# Patient Record
Sex: FEMALE | Race: WHITE | HISPANIC OR LATINO | Employment: STUDENT | URBAN - METROPOLITAN AREA
[De-identification: names, ages, dates, MRNs, and addresses within clinical notes are randomized per-mention and may not be internally consistent; named-entity substitution may affect disease eponyms.]

---
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
## 2025-01-15 ENCOUNTER — HOSPITAL ENCOUNTER (EMERGENCY)
Facility: HOSPITAL | Age: 20
Discharge: HOME | End: 2025-01-16
Attending: STUDENT IN AN ORGANIZED HEALTH CARE EDUCATION/TRAINING PROGRAM
Payer: MEDICAID

## 2025-01-15 DIAGNOSIS — R07.89 CHEST DISCOMFORT: ICD-10-CM

## 2025-01-15 DIAGNOSIS — E28.2 PCOS (POLYCYSTIC OVARIAN SYNDROME): ICD-10-CM

## 2025-01-15 DIAGNOSIS — R07.9 CHEST PAIN, UNSPECIFIED TYPE: Primary | ICD-10-CM

## 2025-01-15 DIAGNOSIS — R07.9 CHEST PAIN: ICD-10-CM

## 2025-01-15 LAB
ALBUMIN SERPL BCP-MCNC: 4.5 G/DL (ref 3.5–5.2)
ALP SERPL-CCNC: 79 U/L (ref 40–150)
ALT SERPL W/O P-5'-P-CCNC: 42 U/L (ref 10–44)
ANION GAP SERPL CALC-SCNC: 12 MMOL/L (ref 8–16)
AST SERPL-CCNC: 35 U/L (ref 10–40)
B-HCG UR QL: NEGATIVE
BASOPHILS # BLD AUTO: 0.04 K/UL (ref 0–0.2)
BASOPHILS NFR BLD: 0.6 % (ref 0–1.9)
BILIRUB SERPL-MCNC: 0.4 MG/DL (ref 0.1–1)
BUN SERPL-MCNC: 7 MG/DL (ref 6–20)
CALCIUM SERPL-MCNC: 10.4 MG/DL (ref 8.7–10.5)
CHLORIDE SERPL-SCNC: 108 MMOL/L (ref 95–110)
CO2 SERPL-SCNC: 20 MMOL/L (ref 23–29)
CREAT SERPL-MCNC: 0.8 MG/DL (ref 0.5–1.4)
CTP QC/QA: YES
D DIMER PPP IA.FEU-MCNC: 0.69 MG/L FEU
DIFFERENTIAL METHOD BLD: NORMAL
EOSINOPHIL # BLD AUTO: 0.3 K/UL (ref 0–0.5)
EOSINOPHIL NFR BLD: 4.3 % (ref 0–8)
ERYTHROCYTE [DISTWIDTH] IN BLOOD BY AUTOMATED COUNT: 12.7 % (ref 11.5–14.5)
EST. GFR  (NO RACE VARIABLE): >60 ML/MIN/1.73 M^2
GLUCOSE SERPL-MCNC: 96 MG/DL (ref 70–110)
HCT VFR BLD AUTO: 39.4 % (ref 37–48.5)
HGB BLD-MCNC: 13.5 G/DL (ref 12–16)
IMM GRANULOCYTES # BLD AUTO: 0.02 K/UL (ref 0–0.04)
IMM GRANULOCYTES NFR BLD AUTO: 0.3 % (ref 0–0.5)
INFLUENZA A, MOLECULAR: NEGATIVE
INFLUENZA B, MOLECULAR: NEGATIVE
LYMPHOCYTES # BLD AUTO: 2.1 K/UL (ref 1–4.8)
LYMPHOCYTES NFR BLD: 33.1 % (ref 18–48)
MCH RBC QN AUTO: 29.2 PG (ref 27–31)
MCHC RBC AUTO-ENTMCNC: 34.3 G/DL (ref 32–36)
MCV RBC AUTO: 85 FL (ref 82–98)
MONOCYTES # BLD AUTO: 0.3 K/UL (ref 0.3–1)
MONOCYTES NFR BLD: 5 % (ref 4–15)
NEUTROPHILS # BLD AUTO: 3.6 K/UL (ref 1.8–7.7)
NEUTROPHILS NFR BLD: 56.7 % (ref 38–73)
NRBC BLD-RTO: 0 /100 WBC
PLATELET # BLD AUTO: 361 K/UL (ref 150–450)
PMV BLD AUTO: 10.1 FL (ref 9.2–12.9)
POTASSIUM SERPL-SCNC: 3.6 MMOL/L (ref 3.5–5.1)
PROT SERPL-MCNC: 8.1 G/DL (ref 6–8.4)
RBC # BLD AUTO: 4.62 M/UL (ref 4–5.4)
SARS-COV-2 RDRP RESP QL NAA+PROBE: NEGATIVE
SODIUM SERPL-SCNC: 140 MMOL/L (ref 136–145)
SPECIMEN SOURCE: NORMAL
TROPONIN I SERPL DL<=0.01 NG/ML-MCNC: <3 NG/L (ref 0–14)
WBC # BLD AUTO: 6.35 K/UL (ref 3.9–12.7)

## 2025-01-15 PROCEDURE — 25000003 PHARM REV CODE 250: Performed by: STUDENT IN AN ORGANIZED HEALTH CARE EDUCATION/TRAINING PROGRAM

## 2025-01-15 PROCEDURE — 93005 ELECTROCARDIOGRAM TRACING: CPT

## 2025-01-15 PROCEDURE — 96374 THER/PROPH/DIAG INJ IV PUSH: CPT

## 2025-01-15 PROCEDURE — 94640 AIRWAY INHALATION TREATMENT: CPT

## 2025-01-15 PROCEDURE — 80053 COMPREHEN METABOLIC PANEL: CPT | Performed by: STUDENT IN AN ORGANIZED HEALTH CARE EDUCATION/TRAINING PROGRAM

## 2025-01-15 PROCEDURE — 84484 ASSAY OF TROPONIN QUANT: CPT | Performed by: STUDENT IN AN ORGANIZED HEALTH CARE EDUCATION/TRAINING PROGRAM

## 2025-01-15 PROCEDURE — 85025 COMPLETE CBC W/AUTO DIFF WBC: CPT | Performed by: STUDENT IN AN ORGANIZED HEALTH CARE EDUCATION/TRAINING PROGRAM

## 2025-01-15 PROCEDURE — 94761 N-INVAS EAR/PLS OXIMETRY MLT: CPT

## 2025-01-15 PROCEDURE — 85379 FIBRIN DEGRADATION QUANT: CPT | Performed by: STUDENT IN AN ORGANIZED HEALTH CARE EDUCATION/TRAINING PROGRAM

## 2025-01-15 PROCEDURE — 25000242 PHARM REV CODE 250 ALT 637 W/ HCPCS: Performed by: STUDENT IN AN ORGANIZED HEALTH CARE EDUCATION/TRAINING PROGRAM

## 2025-01-15 PROCEDURE — 87635 SARS-COV-2 COVID-19 AMP PRB: CPT | Performed by: EMERGENCY MEDICINE

## 2025-01-15 PROCEDURE — 81025 URINE PREGNANCY TEST: CPT | Performed by: STUDENT IN AN ORGANIZED HEALTH CARE EDUCATION/TRAINING PROGRAM

## 2025-01-15 PROCEDURE — 63600175 PHARM REV CODE 636 W HCPCS: Mod: TB | Performed by: STUDENT IN AN ORGANIZED HEALTH CARE EDUCATION/TRAINING PROGRAM

## 2025-01-15 PROCEDURE — 93010 ELECTROCARDIOGRAM REPORT: CPT | Mod: ,,, | Performed by: INTERNAL MEDICINE

## 2025-01-15 PROCEDURE — 99285 EMERGENCY DEPT VISIT HI MDM: CPT | Mod: 25

## 2025-01-15 PROCEDURE — 87502 INFLUENZA DNA AMP PROBE: CPT | Performed by: EMERGENCY MEDICINE

## 2025-01-15 RX ORDER — KETOROLAC TROMETHAMINE 30 MG/ML
10 INJECTION, SOLUTION INTRAMUSCULAR; INTRAVENOUS
Status: COMPLETED | OUTPATIENT
Start: 2025-01-15 | End: 2025-01-15

## 2025-01-15 RX ORDER — LIDOCAINE HYDROCHLORIDE 20 MG/ML
15 SOLUTION OROPHARYNGEAL ONCE
Status: COMPLETED | OUTPATIENT
Start: 2025-01-15 | End: 2025-01-15

## 2025-01-15 RX ORDER — ALUMINUM HYDROXIDE, MAGNESIUM HYDROXIDE, AND SIMETHICONE 1200; 120; 1200 MG/30ML; MG/30ML; MG/30ML
30 SUSPENSION ORAL ONCE
Status: COMPLETED | OUTPATIENT
Start: 2025-01-15 | End: 2025-01-15

## 2025-01-15 RX ORDER — ALBUTEROL SULFATE 2.5 MG/.5ML
2.5 SOLUTION RESPIRATORY (INHALATION)
Status: COMPLETED | OUTPATIENT
Start: 2025-01-15 | End: 2025-01-15

## 2025-01-15 RX ADMIN — ALBUTEROL SULFATE 2.5 MG: 2.5 SOLUTION RESPIRATORY (INHALATION) at 09:01

## 2025-01-15 RX ADMIN — ALUMINUM HYDROXIDE, MAGNESIUM HYDROXIDE, AND SIMETHICONE 30 ML: 200; 200; 20 SUSPENSION ORAL at 10:01

## 2025-01-15 RX ADMIN — LIDOCAINE HYDROCHLORIDE 15 ML: 20 SOLUTION ORAL at 10:01

## 2025-01-15 RX ADMIN — KETOROLAC TROMETHAMINE 10 MG: 30 INJECTION, SOLUTION INTRAMUSCULAR; INTRAVENOUS at 11:01

## 2025-01-15 NOTE — TELEPHONE ENCOUNTER
Unable to retrieve patient chart and identify care due.  [facility] Embedded Care Due Messages. Reference number: [id number].   1/15/2025 9:11:32 AM CST

## 2025-01-15 NOTE — Clinical Note
"Dominique Saldana" Sue Melo was seen and treated in our emergency department on 1/15/2025.  She may return to school on 01/17/2025.      If you have any questions or concerns, please don't hesitate to call.      Justo Landry MD"

## 2025-01-15 NOTE — TELEPHONE ENCOUNTER
Refill Routing Note   Medication(s) are not appropriate for processing by [facility] for the following reason(s):        New or recently adjusted medication    [facility] action(s):  Defer             Appointments  past 12m or future 3m with PCP    Date Provider   Last Visit   12/9/2024 Orquidea Wren MD   Next Visit   3/12/2025 Orquidea Wren MD   ED visits in past 90 days: 0        Note composed:10:22 AM 01/15/2025

## 2025-01-16 VITALS
BODY MASS INDEX: 28.17 KG/M2 | TEMPERATURE: 97 F | DIASTOLIC BLOOD PRESSURE: 62 MMHG | HEART RATE: 81 BPM | HEIGHT: 64 IN | SYSTOLIC BLOOD PRESSURE: 99 MMHG | OXYGEN SATURATION: 99 % | RESPIRATION RATE: 18 BRPM | WEIGHT: 165 LBS

## 2025-01-16 LAB
OHS QRS DURATION: 80 MS
OHS QTC CALCULATION: 424 MS

## 2025-01-16 PROCEDURE — 25500020 PHARM REV CODE 255: Performed by: STUDENT IN AN ORGANIZED HEALTH CARE EDUCATION/TRAINING PROGRAM

## 2025-01-16 RX ORDER — FAMOTIDINE 20 MG/1
20 TABLET, FILM COATED ORAL 2 TIMES DAILY
Qty: 28 TABLET | Refills: 0 | Status: SHIPPED | OUTPATIENT
Start: 2025-01-16 | End: 2025-01-30

## 2025-01-16 RX ADMIN — IOHEXOL 75 ML: 350 INJECTION, SOLUTION INTRAVENOUS at 12:01

## 2025-01-16 NOTE — DISCHARGE INSTRUCTIONS
You were seen in the Emergency Department today for chest pain and shortness of breath. We checked an EKG, bloodwork, Chest X-ray and CT of the chest, all of which were normal.    Please follow up with your primary doctor in the next 3-5 days for a repeat evaluation and further management.    You may take Tylenol (Acetaminophen) 1000 mg every 6 hours (no more than 4000 mg in 24 hours) and Motrin (Ibuprofen) 400 mg every 6 hours as needed for aches/pains/fevers.    We will try starting a medicine that decreases the systemic acid production, Pepcid, 20 mg, 1 tablet twice per day.    Please return to the Emergency Department immediately for any continued or worsening symptoms such as severe pain, you were unable to breathe, I feel dizzy, lightheaded or if you pass out.

## 2025-01-16 NOTE — ED PROVIDER NOTES
Encounter Date: 1/15/2025       History     Chief Complaint   Patient presents with    Shortness of Breath    Chest Pain     Chest feels tight , asthma as a child     19-year-old female with a history of PCOS, childhood asthma presents to the ED with complaints of chest tightness and trouble breathing.  Symptoms began around noon today while at school.  She tried using her inhaler at home it did not help so she came to the ED. the pain is primarily substernal in nature, burning, does not radiate.  She denies any feeling dizzy, lightheaded, passing out, leg swelling, abdominal pain, nausea, vomiting.  She has been well recently, no fevers or chills.        Review of patient's allergies indicates:  No Known Allergies  Past Medical History:   Diagnosis Date    PCOS (polycystic ovarian syndrome)     Strep throat      Past Surgical History:   Procedure Laterality Date    ADENOIDECTOMY  7/25/13     Family History   Problem Relation Name Age of Onset    Diabetes Maternal Grandmother      Hyperlipidemia Maternal Grandmother      Stroke Maternal Grandfather  69    Anesthesia problems Neg Hx      Clotting disorder Neg Hx       Social History     Tobacco Use    Smoking status: Never    Smokeless tobacco: Never   Substance Use Topics    Alcohol use: No    Drug use: No     Review of Systems   Constitutional:  Negative for fever.   HENT:  Negative for sore throat.    Respiratory:  Positive for shortness of breath.    Cardiovascular:  Positive for chest pain.   Gastrointestinal:  Negative for nausea.   Genitourinary:  Negative for dysuria.   Musculoskeletal:  Negative for back pain.   Skin:  Negative for rash.   Neurological:  Negative for weakness.   Hematological:  Does not bruise/bleed easily.       Physical Exam     Initial Vitals [01/15/25 1814]   BP Pulse Resp Temp SpO2   (!) 143/84 84 18 98.3 °F (36.8 °C) 99 %      MAP       --         Physical Exam    Nursing note and vitals reviewed.  Constitutional: She appears  well-developed and well-nourished. She is not diaphoretic. No distress.   HENT:   Head: Normocephalic and atraumatic.   Eyes: EOM are normal. Pupils are equal, round, and reactive to light.   Neck: Neck supple.   Normal range of motion.  Cardiovascular:  Normal rate and regular rhythm.           Pulmonary/Chest: Breath sounds normal. She exhibits tenderness (sternal).   Abdominal: Abdomen is soft. Bowel sounds are normal. She exhibits no distension. There is no abdominal tenderness. There is no rebound.   Musculoskeletal:         General: No tenderness or edema. Normal range of motion.      Cervical back: Normal range of motion and neck supple.     Neurological: She is alert and oriented to person, place, and time.   Skin: Skin is warm and dry. Capillary refill takes less than 2 seconds.         ED Course   Procedures  Labs Reviewed   COMPREHENSIVE METABOLIC PANEL - Abnormal       Result Value    Sodium 140      Potassium 3.6      Chloride 108      CO2 20 (*)     Glucose 96      BUN 7      Creatinine 0.8      Calcium 10.4      Total Protein 8.1      Albumin 4.5      Total Bilirubin 0.4      Alkaline Phosphatase 79      AST 35      ALT 42      eGFR >60.0      Anion Gap 12     D DIMER, QUANTITATIVE - Abnormal    D-Dimer 0.69 (*)    INFLUENZA A & B BY MOLECULAR    Influenza A, Molecular Negative      Influenza B, Molecular Negative      Flu A & B Source NP     SARS-COV-2 RNA AMPLIFICATION, QUAL    SARS-CoV-2 RNA, Amplification, Qual Negative     CBC W/ AUTO DIFFERENTIAL    WBC 6.35      RBC 4.62      Hemoglobin 13.5      Hematocrit 39.4      MCV 85      MCH 29.2      MCHC 34.3      RDW 12.7      Platelets 361      MPV 10.1      Immature Granulocytes 0.3      Gran # (ANC) 3.6      Immature Grans (Abs) 0.02      Lymph # 2.1      Mono # 0.3      Eos # 0.3      Baso # 0.04      nRBC 0      Gran % 56.7      Lymph % 33.1      Mono % 5.0      Eosinophil % 4.3      Basophil % 0.6      Differential Method Automated     TROPONIN  I HIGH SENSITIVITY    Troponin I High Sensitivity <3     POCT URINE PREGNANCY    POC Preg Test, Ur Negative       Acceptable Yes     POCT GLUCOSE MONITORING CONTINUOUS          Imaging Results              CTA Chest Non-Coronary (PE Studies) (Final result)  Result time 01/16/25 01:07:06      Final result by Abi Dugan MD (01/16/25 01:07:06)                   Impression:      Allowing for respiratory motion artifact, no evidence of pulmonary artery thromboembolism through the segmental levels or definite acute intrathoracic abnormality.      Electronically signed by: Abi Dugan MD  Date:    01/16/2025  Time:    01:07               Narrative:    EXAMINATION:  CTA CHEST NON CORONARY (PE STUDIES)    CLINICAL HISTORY:  Pulmonary embolism (PE) suspected, positive D-dimer;    TECHNIQUE:  Low dose axial images, sagittal and coronal reformations were obtained from the thoracic inlet to the lung bases following the IV administration of 75 mL of Omnipaque 350.  Contrast timing was optimized to evaluate the pulmonary arteries.  MIP images were performed.    COMPARISON:  None    FINDINGS:  Please note image quality is mildly degraded by respiratory motion artifact.    The thoracic aorta maintains normal caliber, contour, and course.  There is no evidence of aneurysmal dilation or dissection. The heart is not enlarged and there is no evidence of pericardial effusion.The esophagus maintains a normal course and caliber.There is no axillary or mediastinal adenopathy.  Incidentally noted azygos fissure/lobe.  Hilar contours are within normal limits.    The trachea is midline and proximal airways are patent.  The allowing for respiratory motion artifact the lungs demonstrate no pleural fluid, focal consolidation or pneumothorax.    Allowing for artifact from dense contrast bolus in the SVC and respiratory motion artifact, there are no definite filling defects within the pulmonary arteries through the segmental  levels to suggest pulmonary artery thromboembolism.    Visualized structures of the upper abdomen demonstrate no acute abnormality.  Visualized osseous structures are intact.                                       X-Ray Chest PA And Lateral (Final result)  Result time 01/15/25 23:09:38      Final result by Maurice Andrade MD (01/15/25 23:09:38)                   Impression:      No acute abnormality.      Electronically signed by: Maurice Andrade  Date:    01/15/2025  Time:    23:09               Narrative:    EXAMINATION:  XR CHEST PA AND LATERAL    CLINICAL HISTORY:  Chest pain, unspecified    TECHNIQUE:  PA and lateral views of the chest were performed.    COMPARISON:  None    FINDINGS:  The lungs are clear, with normal appearance of pulmonary vasculature and no pleural effusion or pneumothorax.    The cardiac silhouette is normal in size. The hilar and mediastinal contours are unremarkable.    Bones are intact.                                       Medications   ketorolac injection 9.999 mg (9.999 mg Intravenous Given 1/15/25 2352)   aluminum-magnesium hydroxide-simethicone 200-200-20 mg/5 mL suspension 30 mL (30 mLs Oral Given 1/15/25 2212)     And   LIDOcaine viscous HCl 2% oral solution 15 mL (15 mLs Oral Given 1/15/25 2212)   albuterol sulfate nebulizer solution 2.5 mg (2.5 mg Nebulization Given 1/15/25 2115)   iohexoL (OMNIPAQUE 350) injection 75 mL (75 mLs Intravenous Given 1/16/25 0028)     Medical Decision Making  This is a 19 y.o.female with remote history of asthma, PCOS who presents to the ED for chest pain and shortness of breath.    Initial vital signs:  Stable, afebrile    Initial physical exam:  2+ radial, DP, and PT pulses bilaterally.  Lungs clear to auscultation bilaterally.  Heart with a regular rate and rhythm.    Differential Diagnosis:  Asthma, URI, MSK chest pain, feel less likely ACS, pneumonia, PE in a young otherwise healthy patient    Plan:  EKG, cardiac enzymes, chest x-ray,    I  have reviewed and independently interpreted all available laboratory and imaging studies.    Ultimately patient's workup without clear evidence of an emergent cause of her symptoms.  Labs reassuring other than slightly elevated D-dimer, though CTA of the chest was negative.  Troponin undetectable, doubt ACS.  Patient did not have any improvement in symptoms with nebulizer treatment, and without evidence of wheezing on exam feel this is unlikely to be an acute asthma exacerbation.  Discussed use of Tylenol/NSAIDs as needed for chest wall pain, consideration for starting acid reducing medication for possible underlying GERD, patient agreeable with this.  She is ambulatory throughout her stay in the ED, without any oxygen desaturation, and ambulated with a pulse ox on at 99% without signs of distress.  She is comfortable with plan for discharge home and outpatient follow up with the PCP.    Strict return precautions were discussed, patient verbalized understanding and agreed with plan.  Patient discharged home.    Amount and/or Complexity of Data Reviewed  Labs: ordered. Decision-making details documented in ED Course.  Radiology: ordered.    Risk  OTC drugs.  Prescription drug management.               ED Course as of 01/16/25 0324   Wed Ramirez 15, 2025   2053 Symptoms started around noon [MB]   2358 EKG at 18 15 shows normal sinus rhythm rate 72.  Normal axis.  Normal intervals.  ST flattening and T-wave inversion in V2 and V3.  No STEMI. [MB]   2359 Troponin I High Sensitivity: <3  Troponin negative despite ongoing symptoms that began around noon.  Feel this is very unlikely to be a primary cardiac etiology.  Chest x-ray without evidence of pneumothorax.  On last re-evaluation patient was still feeling as though she could not breathe.  [place] that the nebulizer briefly helped but then made her feel lightheaded afterward.  Added on a D-dimer which was found to be mildly positive, we will obtain CTA. [MB]   u Jan 16,  2025 0110 CTA negative. [MB]   0154 SpO2: 99 % [MB]      ED Course User Index  [MB] Justo Landry MD                             Clinical Impression:  Final diagnoses:  [R07.89] Chest discomfort  [R07.9] Chest pain  [R07.9] Chest pain, unspecified type (Primary)          ED Disposition Condition    Discharge Stable          ED Prescriptions       Medication Sig Dispense Start Date End Date Auth. Provider    famotidine (PEPCID) 20 MG tablet Take 1 tablet (20 mg total) by mouth 2 (two) times daily. for 14 days 28 tablet 1/16/2025 1/30/2025 Justo Landry MD          Follow-up Information       Follow up With Specialties Details Why Contact Info    Orquidea Wren MD Family Medicine Schedule an appointment as soon as possible for a visit in 3 days For a follow up appointment [phone] [facility] [id number]  [phone]               Justo Landry MD  01/16/25 0324

## 2025-01-26 RX ORDER — METFORMIN HYDROCHLORIDE 500 MG/1
500 TABLET ORAL 2 TIMES DAILY WITH MEALS
Qty: 180 TABLET | Refills: 1 | OUTPATIENT
Start: 2025-01-26 | End: 2026-01-26

## 2025-03-20 ENCOUNTER — OFFICE VISIT (OUTPATIENT)
Dept: FAMILY MEDICINE | Facility: CLINIC | Age: 20
End: 2025-03-20
Payer: MEDICAID

## 2025-03-20 VITALS
SYSTOLIC BLOOD PRESSURE: 128 MMHG | WEIGHT: 164.44 LBS | OXYGEN SATURATION: 99 % | HEIGHT: 64 IN | DIASTOLIC BLOOD PRESSURE: 78 MMHG | HEART RATE: 92 BPM | BODY MASS INDEX: 28.07 KG/M2

## 2025-03-20 DIAGNOSIS — E28.2 PCOS (POLYCYSTIC OVARIAN SYNDROME): Primary | ICD-10-CM

## 2025-03-20 DIAGNOSIS — L21.0 SEBORRHEA CAPITIS IN ADULT: ICD-10-CM

## 2025-03-20 DIAGNOSIS — J45.20 MILD INTERMITTENT ASTHMA WITHOUT COMPLICATION: ICD-10-CM

## 2025-03-20 DIAGNOSIS — E78.1 HYPERTRIGLYCERIDEMIA: ICD-10-CM

## 2025-03-20 DIAGNOSIS — J30.9 ALLERGIC RHINITIS, UNSPECIFIED SEASONALITY, UNSPECIFIED TRIGGER: ICD-10-CM

## 2025-03-20 DIAGNOSIS — Z00.00 HEALTHCARE MAINTENANCE: ICD-10-CM

## 2025-03-20 PROCEDURE — 99214 OFFICE O/P EST MOD 30 MIN: CPT | Mod: S$PBB,,, | Performed by: FAMILY MEDICINE

## 2025-03-20 PROCEDURE — 3078F DIAST BP <80 MM HG: CPT | Mod: CPTII,,, | Performed by: FAMILY MEDICINE

## 2025-03-20 PROCEDURE — 3074F SYST BP LT 130 MM HG: CPT | Mod: CPTII,,, | Performed by: FAMILY MEDICINE

## 2025-03-20 PROCEDURE — 1159F MED LIST DOCD IN RCRD: CPT | Mod: CPTII,,, | Performed by: FAMILY MEDICINE

## 2025-03-20 PROCEDURE — 3008F BODY MASS INDEX DOCD: CPT | Mod: CPTII,,, | Performed by: FAMILY MEDICINE

## 2025-03-20 PROCEDURE — 99213 OFFICE O/P EST LOW 20 MIN: CPT | Mod: PBBFAC,PO | Performed by: FAMILY MEDICINE

## 2025-03-20 PROCEDURE — 1160F RVW MEDS BY RX/DR IN RCRD: CPT | Mod: CPTII,,, | Performed by: FAMILY MEDICINE

## 2025-03-20 PROCEDURE — 99999 PR PBB SHADOW E&M-EST. PATIENT-LVL III: CPT | Mod: PBBFAC,,, | Performed by: FAMILY MEDICINE

## 2025-03-20 RX ORDER — METFORMIN HYDROCHLORIDE 500 MG/1
500 TABLET ORAL 2 TIMES DAILY WITH MEALS
Qty: 180 TABLET | Refills: 1 | Status: SHIPPED | OUTPATIENT
Start: 2025-03-20 | End: 2026-03-20

## 2025-03-20 RX ORDER — ALBUTEROL SULFATE 90 UG/1
2 INHALANT RESPIRATORY (INHALATION) EVERY 4 HOURS PRN
Qty: 18 G | Refills: 3 | Status: SHIPPED | OUTPATIENT
Start: 2025-03-20

## 2025-03-20 RX ORDER — CLOBETASOL PROPIONATE 0.5 MG/ML
SOLUTION TOPICAL
COMMUNITY
Start: 2025-01-29 | End: 2025-03-20

## 2025-03-20 RX ORDER — HYDROCORTISONE 25 MG/G
CREAM TOPICAL 2 TIMES DAILY
COMMUNITY
Start: 2024-10-02

## 2025-03-20 RX ORDER — HYDROXYZINE HYDROCHLORIDE 25 MG/1
25 TABLET, FILM COATED ORAL EVERY 8 HOURS PRN
COMMUNITY
Start: 2024-09-25 | End: 2025-03-20 | Stop reason: ALTCHOICE

## 2025-03-20 RX ORDER — AMMONIUM LACTATE 12 G/100G
LOTION TOPICAL
COMMUNITY
Start: 2025-03-10

## 2025-03-24 NOTE — PROGRESS NOTES
.Subjective     Patient ID: Dominique Melo is a 19 y.o. female.    Chief Complaint: Follow-up    HPI  History of Present Illness    CHIEF COMPLAINT:  Dominique presents today for follow-up and to discuss weight loss options    RECENT EMERGENCY ROOM VISIT:  She recently visited the ER for chest pain and shortness of breath with associated wheezing. She describes the sensation as feeling like a continuous anxiety attack and denies similarity to her previous acid reflux symptoms. D-dimer was 69.    PAST MEDICAL HISTORY:  She has a history of asthma with associated wheezing, polycystic ovary syndrome (PCOS), and elevated triglycerides documented on two occasions.    MEDICATIONS:  She reports Metformin ran out 1-2 months ago. She denies side effects while taking Metformin and notes her periods became monthly while on the medication. She currently uses Ketoconazole shampoo and has albuterol for asthma. She takes Zyrtec inconsistently for allergies.    RECENT INFECTIONS:  She contracted scabies through occupational exposure and was treated with hydroxyzine and permethrin lotion.      ROS:  General: -fever, -chills, -fatigue, -weight gain, -weight loss  Eyes: -vision changes, -redness, -discharge  ENT: -ear pain, -nasal congestion, -sore throat  Cardiovascular: +chest pain, -palpitations, -lower extremity edema  Respiratory: -cough, +shortness of breath, +wheezing  Gastrointestinal: -abdominal pain, -nausea, -vomiting, -diarrhea, -constipation, -blood in stool  Genitourinary: -dysuria, -hematuria, -frequency  Musculoskeletal: -joint pain, -muscle pain  Skin: -rash, -lesion, +dry skin  Neurological: -headache, -dizziness, -numbness, -tingling  Psychiatric: +anxiety, -depression, -sleep difficulty, +panic attacks            Objective     Vitals:    03/20/25 1554   BP: 128/78   Pulse: 92        Current Medications[1]     Physical Exam    General: No acute distress. Well-developed. Well-nourished.  Eyes: EOMI. Sclerae  anicteric.  HENT: Normocephalic. Atraumatic. Nares patent. Moist oral mucosa.  Ears: Bilateral TMs clear. Bilateral EACs clear.  Cardiovascular: Regular rate. Regular rhythm. No murmurs. No rubs. No gallops. Normal S1, S2.  Respiratory: Normal respiratory effort. Clear to auscultation bilaterally. No rales. No rhonchi. No wheezing.  Abdomen: Soft. Non-tender. Non-distended. Normoactive bowel sounds.  Musculoskeletal: No  obvious deformity.  Extremities: No lower extremity edema.  Neurological: Alert & oriented x3. No slurred speech. Normal gait.  Psychiatric: Normal mood. Normal affect. Good insight. Good judgment.  Skin: Warm. Dry. No rash.            Assessment and Plan     PCOS (polycystic ovarian syndrome)  -     metFORMIN (GLUCOPHAGE) 500 MG tablet; Take 1 tablet (500 mg total) by mouth 2 (two) times daily with meals.  Dispense: 180 tablet; Refill: 1  -     Comprehensive Metabolic Panel; Future; Expected date: 09/20/2025    Mild intermittent asthma without complication  -     albuterol (PROVENTIL/VENTOLIN HFA) 90 mcg/actuation inhaler; Inhale 2 puffs into the lungs every 4 (four) hours as needed for Wheezing or Shortness of Breath.  Dispense: 18 g; Refill: 3    Hypertriglyceridemia  -     Comprehensive Metabolic Panel; Future; Expected date: 09/20/2025  -     Lipid Panel; Future; Expected date: 09/20/2025    Allergic rhinitis, unspecified seasonality, unspecified trigger    Seborrhea capitis in adult    Healthcare maintenance  -     C. trachomatis/N. gonorrhoeae by AMP DNA [facility]; Urine; Future; Expected date: 09/20/2025         Assessment & Plan    J45.20 Mild intermittent asthma, uncomplicated  E28.2 Polycystic ovarian syndrome  E78.1 Pure hyperglyceridemia  R06.2 Wheezing  J30.89 Other allergic rhinitis  Z86.19 Personal history of other infectious and parasitic diseases  Z57.8 Occupational exposure to other risk factors    IMPRESSION:  - Restarted metformin for PCOS management after patient ran out, noting  good tolerance and regular menstrual cycles. Started metformin for PCOS management, providing a 6-month supply.  - Assessed mild intermittent asthma based on history of wheezing during respiratory infections or allergen exposure.  - Evaluated recent ER visit for chest pain and shortness of breath, likely due to asthma exacerbation rather than acid reflux.  - Discussed weight loss medication options, including potential side effects and long-term efficacy concerns.  - Noted elevated triglycerides, not clinically significant enough to warrant medication at this time.  - Explained relationship between allergies and asthma triggers.  - Discontinued hydroxyzine as it was only used briefly for scabies treatment.  - Discontinued steroid cream as it was not being used.    PLAN SUMMARY:  - Order lipid panel and cholesterol levels for future labs  - Order renal function tests for future labs  - Order STI screening to be completed within 6 months  - Continue Ketoconazole shampoo for scalp treatment  - Continue albuterol inhaler as needed for asthma symptoms  - Restart metformin treatment for PCOS; prescribed 6-month supply  - Start daily Zyrtec for allergy management  - Implement dietary changes to manage hypertriglyceridemia  - Provide brochure on reputable weight loss clinics with affordable medication options  - Follow up in approximately 6 months (around September)  - Complete labs just before next visit    MILD INTERMITTENT ASTHMA:  - Assessed the patient's history of asthma, including recent episodes of wheezing, chest pain, and shortness of breath.  - Diagnosed the patient with mild intermittent asthma based on symptoms and triggers.  - Educated the patient about the nature of mild intermittent asthma, including triggers and appropriateness of as-needed albuterol treatment.  - Continued albuterol inhaler prescription as needed for mild intermittent asthma symptoms.  - Provided a refill for the albuterol inhaler.  -  Instructed the patient to use the albuterol inhaler as needed for asthma symptoms.  - Evaluated the patient's reported wheezing during a recent episode of chest pain and shortness of breath.  - Assessed that the wheezing is likely related to mild intermittent asthma.  - Ensured the patient has albuterol available for treatment of wheezing and other asthma symptoms.    POLYCYSTIC OVARIAN SYNDROME (PCOS):  - Evaluated the patient's PCOS symptoms, noting regular menstrual periods but no significant change in hair-related symptoms.  - Explained the typical timeline for seeing effects of metformin on PCOS symptoms: periods normalize first, then changes in facial hair and hair loss occur over 6-12 months.  - Restarted metformin treatment for PCOS.  - Prescribed a 6-month supply of metformin.  - Advised the patient to continue monitoring menstrual cycle and hair-related symptoms.    HYPERTRIGLYCERIDEMIA:  - Reviewed the patient's triglyceride levels, noting they   were high in 2 separate tests but only slightly elevated.  - Discussed potential causes of high triglycerides and explained their relative importance compared to LDL cholesterol.  - Recommend dietary changes to manage hypertriglyceridemia.  - Ordered future labs to include lipid panel and cholesterol levels.  - Dominique to monitor diet to manage elevated triglycerides, particularly limiting intake of eggs, butter, margarine, fried foods, fast foods, and oils.    ALLERGIC RHINITIS:  - Noted the patient's inconsistent use of allergy medication.  - Emphasized the importance of consistent allergy treatment.  - Recommend daily use of Zyrtec for allergy management.  - Advised the patient to take Zyrtec daily, suggesting to take it at night with vitamins or in the morning before brushing teeth.    HISTORY OF SCABIES:  - Confirmed that the patient's previous scabies infection has been resolved.  - Noted that the patient was treated with permethrin for  scabies.    OCCUPATIONAL EXPOSURE:  - Noted the patient's report of occupational exposure to scabies.  - Advised the patient on preventive measures to avoid future occupational exposure to scabies.    ADDITIONAL MANAGEMENT:  - Provided information on weight loss medications, including potential side effects (GI issues, nausea, diarrhea, constipation) and likelihood of weight regain after discontinuation.  - Will provide a brochure with information on reputable weight loss clinics offering more affordable medication options.  - Continued Ketoconazole shampoo for scalp treatment.  - Ordered STI screening to be completed within the next 6 months.    FOLLOW-UP AND FUTURE LABS:  - Ordered future labs to include renal function tests.  - Follow up in approximately 6 months (around September).  - Complete labs just before the next visit.              Follow up in about 6 months (around 9/20/2025).    This note was generated with the assistance of ambient listening technology. Verbal consent was obtained by the patient and accompanying visitor(s) for the recording of patient appointment to facilitate this note. I attest to having reviewed and edited the generated note for accuracy, though some syntax or spelling errors may persist. Please contact the author of this note for any clarification.         [1]   Current Outpatient Medications   Medication Sig Dispense Refill    ammonium lactate (LAC-HYDRIN) 12 % lotion Apply topically.      cetirizine (ZYRTEC) 10 MG tablet Take 1 tablet (10 mg total) by mouth once daily. 90 tablet 3    fluticasone propionate (FLONASE) 50 mcg/actuation nasal spray 2 sprays (100 mcg total) by Each Nostril route once daily. 16 g 11    hydrocortisone 2.5 % cream Apply topically 2 (two) times daily.      ketoconazole (NIZORAL) 2 % shampoo Apply topically.      albuterol (PROVENTIL/VENTOLIN HFA) 90 mcg/actuation inhaler Inhale 2 puffs into the lungs every 4 (four) hours as needed for Wheezing or  Shortness of Breath. 18 g 3    metFORMIN (GLUCOPHAGE) 500 MG tablet Take 1 tablet (500 mg total) by mouth 2 (two) times daily with meals. 180 tablet 1     No current facility-administered medications for this visit.